# Patient Record
Sex: FEMALE | Race: WHITE
[De-identification: names, ages, dates, MRNs, and addresses within clinical notes are randomized per-mention and may not be internally consistent; named-entity substitution may affect disease eponyms.]

---

## 2018-12-11 ENCOUNTER — HOSPITAL ENCOUNTER (EMERGENCY)
Dept: HOSPITAL 97 - ER | Age: 33
Discharge: HOME | End: 2018-12-11
Payer: SELF-PAY

## 2018-12-11 DIAGNOSIS — K02.9: Primary | ICD-10-CM

## 2018-12-11 PROCEDURE — 96372 THER/PROPH/DIAG INJ SC/IM: CPT

## 2018-12-11 PROCEDURE — 99283 EMERGENCY DEPT VISIT LOW MDM: CPT

## 2018-12-11 NOTE — EDPHYS
Physician Documentation                                                                           

 [facility]                                                                

Name: Roxie Burciaga                                                                              

Age: 33 yrs                                                                                       

Sex: Female                                                                                       

: 1985                                                                                   

MRN: [id number]                                                                                   

Arrival Date: 2018                                                                          

Time: 17:03                                                                                       

Account#: [id number]                                                                            

Bed 13                                                                                            

Private MD: None, None                                                                            

ED Physician Willi Cruz                                                                         

HPI:                                                                                              

                                                                                             

17:38 This 33 yrs old  Female presents to ER via Ambulatory with complaints of       snw 

      Toothache.                                                                                  

17:38 The patient presents with broken tooth/teeth, pain, redness. The problem is located in  snw 

      the lower left first molar (#19) and lower left second molar (#18). Onset: The              

      symptoms/episode began/occurred suddenly, 2 day(s) ago, and became persistent.              

      Duration: The symptoms are continuous. Associated signs and symptoms: The patient has       

      no apparent associated signs or symptoms. Severity of symptoms: At their worst the          

      symptoms were moderate, severe. It is unknown whether or not the patient has had            

      similar symptoms in the past. The patient has not recently seen a physician.                

                                                                                                  

OB/GYN:                                                                                           

17:10 LMP 2018                                                                          ph  

                                                                                                  

Historical:                                                                                       

- Allergies:                                                                                      

17:10 No Known Allergies;                                                                     ph  

- Home Meds:                                                                                      

17:10 None [Active];                                                                          ph  

- PMHx:                                                                                           

17:10 None;                                                                                   ph  

- PSHx:                                                                                           

17:10 None;                                                                                   ph  

                                                                                                  

- Immunization history:: Flu vaccine status is unknown.                                           

- Social history:: Smoking status: unknown.                                                       

- Ebola Screening: : No symptoms or risks identified at this time.                                

                                                                                                  

                                                                                                  

ROS:                                                                                              

17:38 Constitutional: Negative for fever, chills, and weight loss, Eyes: Negative for injury, snw 

      pain, redness, and discharge, ENT: Negative for injury and discharge, + left mandibular     

      pain Neck: Negative for injury, pain, and swelling, Cardiovascular: Negative for chest      

      pain, palpitations, and edema, Respiratory: Negative for shortness of breath, cough,        

      wheezing, and pleuritic chest pain, Abdomen/GI: Negative for abdominal pain, nausea,        

      vomiting, diarrhea, and constipation, Back: Negative for injury and pain, : Negative      

      for injury, bleeding, discharge, and swelling, MS/Extremity: Negative for injury and        

      deformity, Skin: Negative for injury, rash, and discoloration, Neuro: Negative for          

      headache, weakness, numbness, tingling, and seizure.                                        

                                                                                                  

Exam:                                                                                             

17:37 Constitutional:  This is a well developed, well nourished patient who is awake, alert,  snw 

      and in no acute distress. Head/Face:  Normocephalic, atraumatic. Eyes:  Pupils equal        

      round and reactive to light, extra-ocular motions intact.  Lids and lashes normal.          

      Conjunctiva and sclera are non-icteric and not injected.  Cornea within normal limits.      

      Periorbital areas with no swelling, redness, or edema. Neck:  Trachea midline, no           

      thyromegaly or masses palpated, and no cervical lymphadenopathy.  Supple, full range of     

      motion without nuchal rigidity, or vertebral point tenderness.  No Meningismus.             

      Chest/axilla:  Normal chest wall appearance and motion.  Nontender with no deformity.       

      No lesions are appreciated. Cardiovascular:  Regular rate and rhythm with a normal S1       

      and S2.  No gallops, murmurs, or rubs.  Normal PMI, no JVD.  No pulse deficits.             

      Respiratory:  Lungs have equal breath sounds bilaterally, clear to auscultation and         

      percussion.  No rales, rhonchi or wheezes noted.  No increased work of breathing, no        

      retractions or nasal flaring. Abdomen/GI:  Soft, non-tender, with normal bowel sounds.      

      No distension or tympany.  No guarding or rebound.  No evidence of tenderness               

      throughout. Back:  No spinal tenderness.  No costovertebral tenderness.  Full range of      

      motion. Skin:  Warm, dry with normal turgor.  Normal color with no rashes, no lesions,      

      and no evidence of cellulitis. MS/ Extremity:  Pulses equal, no cyanosis.                   

      Neurovascular intact.  Full, normal range of motion. Neuro:  Awake and alert, GCS 15,       

      oriented to person, place, time, and situation.  Cranial nerves II-XII grossly intact.      

      Motor strength 5/5 in all extremities.  Sensory grossly intact.  Cerebellar exam            

      normal.  Normal gait. Psych:  Awake, alert, with orientation to person, place and time.     

       Behavior, mood, and affect are within normal limits.                                       

17:37 ENT: External ear(s): are unremarkable, Ear canal(s): are normal, TM's: are normal,         

      Nose: is normal, Mouth: is normal, Posterior pharynx: is normal, Dental exam: dental        

      caries, that is moderate, that is severe, diffusely, fractured teeth are noted,             

      specifically the  lower left second molar (#18) and lower left first molar (#19),           

      gingival erythema.                                                                          

                                                                                                  

Vital Signs:                                                                                      

17:10  / 92; Pulse 67; Resp 18; Temp 98.6(TE); Pulse Ox 100% on R/A; Weight 99.79 kg;   ph  

      Height 5 ft. 8 in. (172.72 cm); Pain 10/10;                                                 

17:10 Body Mass Index 33.45 (99.79 kg, 172.72 cm)                                             ph  

                                                                                                  

MDM:                                                                                              

17:12 Patient medically screened.                                                             snw 

17:36 Data reviewed: vital signs, nurses notes. Data interpreted: Pulse oximetry: on room air snw 

      is 100 %. Interpretation: normal. Counseling: I had a detailed discussion with the          

      patient and/or guardian regarding: the historical points, exam findings, and any            

      diagnostic results supporting the discharge/admit diagnosis, the need for outpatient        

      follow up, for definitive care, to return to the emergency department if symptoms           

      worsen or persist or if there are any questions or concerns that arise at home. Special     

      discussion: I have referred the patient to see his PCP for further evaluation of high       

      blood pressure. Based on the history and exam findings, there is no indication for          

      further emergent testing or inpatient evaluation. I discussed with the patient/guardian     

      the need to see a dentist for further evaluation of the symptoms. I discussed with the      

      patient/guardian the need to see the primary care provider for further evaluation of        

      the symptoms.                                                                               

                                                                                                  

Administered Medications:                                                                         

17:36 Drug: Clindamycin 600 mg {Note: 2 ml in left gluteus and 2 ml in right gluteus.} Route: mg2 

      IM; Site: left gluteus;                                                                     

17:51 Follow up: Response: No adverse reaction                                                mg2 

17:36 Drug: UltRAM 50 mg Route: PO;                                                           mg2 

17:51 Follow up: Response: No adverse reaction; Medication administered at discharge.         mg2 

                                                                                                  

                                                                                                  

Disposition:                                                                                      

17:54 Co-signature as Attending Physician, Willi Cruz MD.                                    rn  

                                                                                                  

Disposition:                                                                                      

18 17:17 Discharged to Home. Impression: Dental caries.                                     

- Condition is Stable.                                                                            

- Discharge Instructions: Dental Abscess, Dental Pain, Diet and Dental Disease, Dental            

  Caries, Easy-to-Read, Preventive Dental Care, Adult.                                            

- Prescriptions for chlorhexidine gluconate 0.12 % Mucous Membrane mouthwash - place 15           

  milliliter by MUCOUS MEMBRANE route 2 times per day after brushing teeth, swish in              

  mouth for 30 seconds then spit out; 480 milliliter. Clindamycin HCl 300 mg Oral                 

  Capsule - take 1 capsule by ORAL route every 6 hours for 10 days; 40 capsule.                   

  Diclofenac Sodium 75 mg Oral Tablet Sustained Release - take 1 tablet by ORAL route 2           

  times per day; 30 tablet.                                                                       

- Medication Reconciliation Form, Thank You Letter, Antibiotic Education, Prescription            

  Opioid Use form.                                                                                

- Follow up: Private Physician; When: 1 - 2 days; Reason: Recheck today's complaints,             

  Continuance of care, Re-evaluation by your physician. Follow up: Emergency                      

  Department; When: As needed; Reason: Worsening of condition.                                    

                                                                                                  

                                                                                                  

                                                                                                  

Signatures:                                                                                       

Belen Espino, FNP-C                 FNP-Csnw                                                  

Willi Cruz MD MD   rn                                                   

Marie Rodriguez RN                      RN                                                      

Rock Armas RN                    RN   [facility]                                                  

                                                                                                  

Corrections: (The following items were deleted from the chart)                                    

17:54 17:17 2018 17:17 Discharged to Home. Impression: Dental caries. Condition is      mg2 

      Stable. Forms are Medication Reconciliation Form, Thank You Letter, Antibiotic              

      Education, Prescription Opioid Use. Follow up: Private Physician; When: 1 - 2 days;         

      Reason: Recheck today's complaints, Continuance of care, Re-evaluation by your              

      physician. Follow up: Emergency Department; When: As needed; Reason: Worsening of           

      condition. snw                                                                              

                                                                                                  

**************************************************************************************************
no

## 2018-12-11 NOTE — ER
Nurse's Notes                                                                                     

 [facility]                                                                

Name: Roxie Burciaga                                                                              

Age: 33 yrs                                                                                       

Sex: Female                                                                                       

: 1985                                                                                   

MRN: [id number]                                                                                   

Arrival Date: 2018                                                                          

Time: 17:03                                                                                       

Account#: [id number]                                                                            

Bed 13                                                                                            

Private MD: None, None                                                                            

Diagnosis: Dental caries                                                                          

                                                                                                  

Presentation:                                                                                     

                                                                                             

17:09 Presenting complaint: Patient states: Pain in lower L molar x 2 days, denies fever,     ph  

      N/V. Transition of care: patient was not received from another setting of care. Onset       

      of symptoms was 2018. Risk Assessment: Do you want to hurt yourself or         

      someone else? Patient reports no desire to harm self or others. Initial Sepsis Screen:      

      Does the patient meet any 2 criteria? No. Patient's initial sepsis screen is negative.      

      Does the patient have a suspected source of infection? No. Patient's initial sepsis         

      screen is negative. Care prior to arrival: Medication(s) given: Motrin, 400 mg, at 1630.    

17:09 Method Of Arrival: Ambulatory                                                           ph  

17:09 Acuity: IMAN 4                                                                           ph  

                                                                                                  

OB/GYN:                                                                                           

17:10 LMP 2018                                                                          ph  

                                                                                                  

Historical:                                                                                       

- Allergies:                                                                                      

17:10 No Known Allergies;                                                                     ph  

- Home Meds:                                                                                      

17:10 None [Active];                                                                          ph  

- PMHx:                                                                                           

17:10 None;                                                                                   ph  

- PSHx:                                                                                           

17:10 None;                                                                                   ph  

                                                                                                  

- Immunization history:: Flu vaccine status is unknown.                                           

- Social history:: Smoking status: unknown.                                                       

- Ebola Screening: : No symptoms or risks identified at this time.                                

                                                                                                  

                                                                                                  

Screenin:52 Abuse screen: Denies threats or abuse. Denies injuries from another. Nutritional        mg2 

      screening: No deficits noted. Tuberculosis screening: Fall Risk None identified.            

                                                                                                  

Assessment:                                                                                       

17:51 General: Appears in no apparent distress. comfortable, Behavior is calm, cooperative.   mg2 

      Pain: Complains of pain in lower left first molar (#19) and lower left second molar         

      (#18). Neuro: Level of Consciousness is awake, alert, obeys commands, Oriented to           

      person, place, time. Cardiovascular: No deficits noted. Respiratory: Reports. GI: No        

      deficits noted. : No deficits noted. EENT: Poor dentition noted. Dental caries noted      

      in lower left first molar (#19) and lower left second molar (#18). Derm: Skin is            

      intact, is healthy with good turgor, Skin is pink, warm \T\ dry. normal. Musculoskeletal:   

      Circulation, motion, and sensation intact. Capillary refill < 3 seconds.                    

                                                                                                  

Vital Signs:                                                                                      

17:10  / 92; Pulse 67; Resp 18; Temp 98.6(TE); Pulse Ox 100% on R/A; Weight 99.79 kg;   ph  

      Height 5 ft. 8 in. (172.72 cm); Pain 10/10;                                                 

17:10 Body Mass Index 33.45 (99.79 kg, 172.72 cm)                                             ph  

                                                                                                  

ED Course:                                                                                        

17:03 Patient arrived in ED.                                                                  ph  

17:04 None, None is Private Physician.                                                        mr  

17:10 Triage completed.                                                                       ph  

17:11 Rock Armas, JOSE is Primary Nurse.                                                  mg2 

17:11 [facility]                                                        snw 

17:11 Willi Cruz MD is Attending Physician.                                                snw 

17:11 Arm band placed on Patient placed in an exam room.                                      ph  

17:53 Patient has correct armband on for positive identification.                             mg2 

17:53 No provider procedures requiring assistance completed. Patient did not have IV access   mg2 

      during this emergency room visit.                                                           

                                                                                                  

Administered Medications:                                                                         

17:36 Drug: Clindamycin 600 mg {Note: 2 ml in left gluteus and 2 ml in right gluteus.} Route: mg2 

      IM; Site: left gluteus;                                                                     

17:51 Follow up: Response: No adverse reaction                                                mg2 

17:36 Drug: UltRAM 50 mg Route: PO;                                                           mg2 

17:51 Follow up: Response: No adverse reaction; Medication administered at discharge.         mg2 

                                                                                                  

                                                                                                  

Outcome:                                                                                          

17:17 Discharge ordered by MD.                                                                snw 

17:53 Discharged to home ambulatory.                                                          mg2 

17:53 Condition: stable                                                                           

17:53 Discharge instructions given to patient, Instructed on discharge instructions, follow       

      up and referral plans. medication usage, Demonstrated understanding of instructions,        

      follow-up care, medications, Prescriptions given X 3.                                       

17:54 Patient left the ED.                                                                    mg2 

                                                                                                  

Signatures:                                                                                       

Belen Espino FNP-C FNP-Jackie                                                  

Danielle OrtegaMarie, RN                      RN   ph                                                   

Rock Armas, JOSE                    RN   mg2                                                  

                                                                                                  

**************************************************************************************************